# Patient Record
(demographics unavailable — no encounter records)

---

## 2025-06-04 NOTE — HISTORY OF PRESENT ILLNESS
[Patient reported PAP Smear was normal] : Patient reported PAP Smear was normal [Patient reported mammogram was normal] : Patient reported mammogram was normal [Patient reported breast sonogram was normal] : Patient reported breast sonogram was normal [postmenopausal] : postmenopausal [Y] : Positive pregnancy history [Previously active] : previously active [Men] : men [FreeTextEntry1] : 55 yr old P2 here for annual exam. Patient has no gyn complaints [Mammogramdate] : 06/25/24 [BreastSonogramDate] : 06/05/2024 [TextBox_19] : LettsWELL [PapSmeardate] : 06/04/2024 [TextBox_31] : HPV NEGATIVE [BoneDensityDate] : 06/2024 [TextBox_37] : OSTEOPENIA [ColonoscopyDate] : NEVER [LMPDate] : 2019 [PGHxTotal] : 4 [[facility] : 2 [PGHxABSpont] : 2